# Patient Record
Sex: FEMALE | URBAN - METROPOLITAN AREA
[De-identification: names, ages, dates, MRNs, and addresses within clinical notes are randomized per-mention and may not be internally consistent; named-entity substitution may affect disease eponyms.]

---

## 2022-12-13 ENCOUNTER — HOME HEALTH ADMISSION (OUTPATIENT)
Dept: HOME HEALTH SERVICES | Facility: HOME HEALTH | Age: 77
End: 2022-12-13
Payer: MEDICARE

## 2022-12-15 ENCOUNTER — HOME CARE VISIT (OUTPATIENT)
Dept: SCHEDULING | Facility: HOME HEALTH | Age: 77
End: 2022-12-15
Payer: MEDICARE

## 2022-12-15 PROCEDURE — G0299 HHS/HOSPICE OF RN EA 15 MIN: HCPCS

## 2022-12-16 ENCOUNTER — HOME CARE VISIT (OUTPATIENT)
Dept: SCHEDULING | Facility: HOME HEALTH | Age: 77
End: 2022-12-16
Payer: MEDICARE

## 2022-12-16 PROCEDURE — G0151 HHCP-SERV OF PT,EA 15 MIN: HCPCS

## 2022-12-17 ASSESSMENT — ENCOUNTER SYMPTOMS: HEMOPTYSIS: 0

## 2022-12-18 VITALS
OXYGEN SATURATION: 97 % | DIASTOLIC BLOOD PRESSURE: 84 MMHG | SYSTOLIC BLOOD PRESSURE: 132 MMHG | RESPIRATION RATE: 18 BRPM | TEMPERATURE: 97.5 F | HEART RATE: 77 BPM

## 2022-12-18 VITALS
SYSTOLIC BLOOD PRESSURE: 138 MMHG | RESPIRATION RATE: 16 BRPM | DIASTOLIC BLOOD PRESSURE: 70 MMHG | HEART RATE: 78 BPM | TEMPERATURE: 97.4 F | OXYGEN SATURATION: 97 %

## 2022-12-18 ASSESSMENT — ENCOUNTER SYMPTOMS: DYSPNEA ACTIVITY LEVEL: AFTER AMBULATING 10 - 20 FT

## 2022-12-18 NOTE — HOME HEALTH
Pt is an 69 yo female s/p fall outside while walking to the mailbox back in July 2021. Pt underwent surgery due to patellar fx. Pt had complications with infection resulting in 2 additional surgeries. 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 Pt then began to have significant pain back in July 2022, noted to have another infection. Pt underwent another sx to remove the hardware and currently has a spacer and is on IV antibiotics through a picc line until 1/3/23. Pt is using a LLE brace at all times, able to place 20% WB and able to flex to 45 degrees. Skilled PT intervention orders received. PMH includes Fall, RTKA, HTN. Jinny Holiday. Pt lives with her  in a single story home with 1 step down to living room. Pt was independent and active without an AD prior to the initial fall. Pt is originally from [place] and will be returning in April of next year. Pt presents now using a wc mainly for all mobility, shows decreased ambulation distance, unsteady gait pattern, BLE muscle weakness, impaired dynamic balance and decreased cv endurance causing difficulty performing safe functional transfers from various surfaces throughout home, standing activities and household ambulation without assistance as able to do in [place]. Pt will benefit from skilled PT intervention in order to improve functional deficits to allow pt to perform functional transfers and ambulation throughout home independently using her RW for support. PT reviewed POC, tx frequency, tx goals, safety precautions, fall prevention strategies, safe med management and energy conservation techniques.

## 2022-12-18 NOTE — HOME HEALTH
Patient is a 68 year y/o/f discharged to home after left leg joint replacement. Past medical history of prior left knee surgeries and complications, HTN, major depressive disorder, hyperlipidemia and constipation. Patient lives with  in a one story home. Patient alert & oriented x 4, VS stable, denies any new open wounds or rashes, denies any falls since returning home. Brace to left leg intact, steri-strips clean dry and intact to left knee. Pt and  educated on IV push medication.  completed first home infusion with no problems, agrees to complete infusion every 8 hours per MD orders. Pt has follow up apt with PCP scheduled. All questions/concerns addressed, will continue to [facility]. POC approval received from PCP. Caregiver involvement:    Medications reconciled and all medications are available. Home health supplies by type and quantity ordered/delivered this visit include: NA    Patient education provided this visit: SN educated patient and patient's caregiver on Admission process, Call us first, Patient and patient caregiver verbalizes understanding via the teach back method.     Progress toward goals: General acute hospital[facility] today     Home exercise program: continue as ordered    Plan for next visit: medication/disease teaching/management    The following discharge planning was discussed with the patient/ patient's caregiver: DC when goals met

## 2022-12-19 ENCOUNTER — HOME CARE VISIT (OUTPATIENT)
Dept: SCHEDULING | Facility: HOME HEALTH | Age: 77
End: 2022-12-19
Payer: MEDICARE

## 2022-12-19 VITALS
HEART RATE: 78 BPM | OXYGEN SATURATION: 97 % | RESPIRATION RATE: 18 BRPM | SYSTOLIC BLOOD PRESSURE: 138 MMHG | DIASTOLIC BLOOD PRESSURE: 75 MMHG | TEMPERATURE: 97.7 F

## 2022-12-19 PROCEDURE — G0152 HHCP-SERV OF OT,EA 15 MIN: HCPCS

## 2022-12-19 ASSESSMENT — ENCOUNTER SYMPTOMS: HEMOPTYSIS: 0

## 2022-12-19 NOTE — HOME HEALTH
Pt is an 68 y.o female, RH, admitted to [address] [place] [address] for the dx of sx after patella fx sec a fall retrieving the mail. Pt had complications with infection resulting in 2 additional surgeries. 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 Pt underwent another sx to remove the hardware and currently has a spacer and is on IV antibiotics through a pic line scheduled to be removed on1/3/23. PMHx:  Fall, RTKA, HTN. Alicia Gang. BTKR ( 5 years a go)  PLOF: Indep with AL's and IADL's   Pt lives with her  in a single story home with 1 step down to living room, which pt is not using at [facility] time, pt is ambulating using mostly wc and accessing the house using the garage. Patient using the LLE brace at all times, she is allowed 20% WWB. No showers at this time to protect pic line. Pt is sponge bathing with CGA and requiring assistance to safley transfer to and form bed and toilet. Pt's  is very supportive and assisting patient as needed. MMT is 4-/5 and she displays WN ROM in BUE. Pt is ambulating shorts distance with assistance using 2ww. Patient was instructed in proper sequencing with bed mobility and an ongoing assessment for bed side rail. Pt could benefit from OT to adress the above issues. Pt in agreement with POC and she reports she wants to return to PLOF.

## 2022-12-20 ENCOUNTER — HOME CARE VISIT (OUTPATIENT)
Dept: SCHEDULING | Facility: HOME HEALTH | Age: 77
End: 2022-12-20
Payer: MEDICARE

## 2022-12-20 VITALS
HEART RATE: 95 BPM | SYSTOLIC BLOOD PRESSURE: 131 MMHG | DIASTOLIC BLOOD PRESSURE: 76 MMHG | OXYGEN SATURATION: 97 % | TEMPERATURE: 97.2 F | RESPIRATION RATE: 18 BRPM

## 2022-12-20 PROCEDURE — G0157 HHC PT ASSISTANT EA 15: HCPCS

## 2022-12-20 NOTE — HOME HEALTH
Patient presents in her [facility] with brace/immobilizer on her left LE and left LE elevated on WC leg rest.  Adjusted patient's immobilizer prior to ex instruction, as she stated it loosens during the day. Recommended placing soft cloth or fabric where the brace wraps posterior on her thigh to protect her skin and discussed having spouse check skin regularly to make sure there is no irritation from the brace. Patient wears her brace/immobilizer 24/7 currently. Instructed sit/stand from [facility] to RW with Vc's for protection of affected LE (left) with both sitting and standing, safe UE placement, feeling for chair before attempting to sit and pausing for balance in standing. With pre-cuing, patient improved her technique on 3/6 reps of sit/stand. Initiated standing ex at counter for her left LE with WB on the right LE including hip extension, abduction, HS curl and small knee lift -- patient tolerated reps to 15, one seated break recommended by Clinician. Initiated theraband resistance (green) with seated LE ex for her right LE including LAQ, seated side step, knee lift and leg extension circles (no resistance). Worked on standing static balance with unsupported theraband pulls (horizontal abduction). Instructed RW gait training indoors with focus on safey and 20% WB for her left LE. Patient demonstrates good knowledge of 20% WB left (LE) when ambulating with her RW, tolerates 50ft indoors on today's visit. Instructed her in seated HS/calf stretches for both legs with cloth belt for mobility and to help prevent muscle tightness and pain. Written HEP for standing ex with the left LE prepared and provided this visit. Patient tolerated all activiites well with Min fatigue. Has follow up with ortho MD tomorrow.

## 2022-12-21 ENCOUNTER — HOME CARE VISIT (OUTPATIENT)
Dept: SCHEDULING | Facility: HOME HEALTH | Age: 77
End: 2022-12-21
Payer: MEDICARE

## 2022-12-21 VITALS
HEART RATE: 99 BPM | DIASTOLIC BLOOD PRESSURE: 78 MMHG | SYSTOLIC BLOOD PRESSURE: 126 MMHG | OXYGEN SATURATION: 96 % | TEMPERATURE: 97.4 F | RESPIRATION RATE: 18 BRPM

## 2022-12-21 PROCEDURE — G0299 HHS/HOSPICE OF RN EA 15 MIN: HCPCS

## 2022-12-22 ENCOUNTER — HOME CARE VISIT (OUTPATIENT)
Dept: SCHEDULING | Facility: HOME HEALTH | Age: 77
End: 2022-12-22
Payer: MEDICARE

## 2022-12-22 PROCEDURE — G0157 HHC PT ASSISTANT EA 15: HCPCS

## 2022-12-22 ASSESSMENT — ENCOUNTER SYMPTOMS
DYSPNEA ACTIVITY LEVEL: AFTER AMBULATING LESS THAN 10 FT
HEMOPTYSIS: 0

## 2022-12-22 NOTE — HOME HEALTH
SN for skilled assessments, education/management of medications, safety and disease process. PICC line maintence completed, tolerated well. Progressing in teaching and improving in care. VS all wnl, denies any changes in medication, shob, falls, pain, or any new open wounds at this time. No s/s of distress during SN visit. Aware and approves of next scheduled SN visit.

## 2022-12-23 VITALS
RESPIRATION RATE: 18 BRPM | SYSTOLIC BLOOD PRESSURE: 126 MMHG | DIASTOLIC BLOOD PRESSURE: 84 MMHG | OXYGEN SATURATION: 97 % | HEART RATE: 89 BPM | TEMPERATURE: 97 F

## 2022-12-23 NOTE — HOME HEALTH
Patient states saw Dr. Jim Nicolas yesterday for follow up and that MD has progressed her to 50% WB (L) LE from 20%. Patient is still wearing immobilizer on left LE. She states MD ok'd a less restrictive brace for overnight in bed, but she plans to keep the immobilizer on overnight to support her knee. Instructed her in 50% WB with RW gait indoors, throughout the home and progressed her to RW gait training outside through garage to front driveway. 80 ft and 150 ft with seated rest in between segments. Gait training included step training at back door and education on lead leg with steps for both patient and spouse so that he can help reinforce safe technique. Showed patient how to position self and walker with step so that she can use increased UE support to take presssure off her leg and stabilize herself with the non-surgery leg. Patient was SBA w/Vcs going down step and MIN/CGA going up. Unable to safely navigate step at back patio due to the step being wide and deep. Discussed that patient will use the garage door and entrance to get outside with the walker, with SBA from her spouse for safely. Her adherance to 50% WB once instructed was good with cues to control her stride and advance walker so she does not step outside the walker. Educated patient on safe RW turns to avoid pivoting -- 50% cues for safe turns. Patient showed good balance on uneven pavers outside using RW and reported MIN fatigue with increased distance. Re-instructed standing LE ex for the left LE, including safe body position to counter and safe ALLYN with the exercises. Also re-instructed seated LE HEP with manual resistance added for the right LE. Patient verbalized understanding of all education today.

## 2022-12-27 ENCOUNTER — HOME CARE VISIT (OUTPATIENT)
Dept: SCHEDULING | Facility: HOME HEALTH | Age: 77
End: 2022-12-27
Payer: MEDICARE

## 2022-12-27 VITALS
RESPIRATION RATE: 18 BRPM | HEART RATE: 87 BPM | OXYGEN SATURATION: 98 % | DIASTOLIC BLOOD PRESSURE: 77 MMHG | TEMPERATURE: 97.9 F | SYSTOLIC BLOOD PRESSURE: 134 MMHG

## 2022-12-27 PROCEDURE — G0157 HHC PT ASSISTANT EA 15: HCPCS

## 2022-12-27 NOTE — HOME HEALTH
Patient is in good spirits and states has ambulated into the garage with her RW (includes 1 step) last few days for light laundry chores. Patient uses a visual reminder (pen alphonso on each thigh) to remind her of correct lead leg with steps. Instructed step training during today's visit including step from kitchen to garage and step from garage to outside walkway (2x each step). 30% cues given for increased UE support on the walker with steps and positioning herself close enough to the step to avoid excessive forward flexion. Gait distance about 50 ft. Patient demonstrated improved technique on her second attempt with these steps. Instructed RW gait training through the home, having patient access all available rooms on the lower level -- 80 ft. Patient demonstrates good awareness of 50% WB for her left LE with RW gait and transfers. Encouraged her to walk household distances with her RW more frequently at 50% WB and have  present for RW gait directly outside the home. Patient declines outside gait today due to cold weather. Re-educated her on keeping her RW close to where she sits so she is not tempted to stand without an AD. Re-instructed her standing LE HEP for the left LE (surgery leg), 30% cues provided for safe body position to counter, safe ALLYN and posture. Patient tolerates 2 activities at a time standing today. Worked on balance with side steps at counter (2 hand support) and unsupported static standing with forward and sideways reaching. Instructed sit/stand from arm chair with focus on controlled descent to chair. Patient improved 4/5 reps of sit/stand from RW to arm chair after cuing.   She verbalizes understanding of all education provided today including safe body mechanics with ADLS both sitting and standing, keeping RW close to where she sits with transfers, resting before tempted to place too much weight on surgery leg and having spouse on SBA for outside RW ambulation. Signature on paper, forgot signature on visit.

## 2022-12-28 ENCOUNTER — HOME CARE VISIT (OUTPATIENT)
Dept: HOME HEALTH SERVICES | Facility: HOME HEALTH | Age: 77
End: 2022-12-28
Payer: MEDICARE

## 2022-12-28 ENCOUNTER — HOME CARE VISIT (OUTPATIENT)
Dept: SCHEDULING | Facility: HOME HEALTH | Age: 77
End: 2022-12-28
Payer: MEDICARE

## 2022-12-28 VITALS
TEMPERATURE: 97.3 F | SYSTOLIC BLOOD PRESSURE: 124 MMHG | RESPIRATION RATE: 16 BRPM | DIASTOLIC BLOOD PRESSURE: 68 MMHG | HEART RATE: 78 BPM | OXYGEN SATURATION: 97 %

## 2022-12-28 PROCEDURE — G0299 HHS/HOSPICE OF RN EA 15 MIN: HCPCS

## 2022-12-28 ASSESSMENT — ENCOUNTER SYMPTOMS
HEMOPTYSIS: 0
DYSPNEA ACTIVITY LEVEL: AFTER AMBULATING 10 - 20 FT

## 2022-12-29 ENCOUNTER — HOME CARE VISIT (OUTPATIENT)
Dept: SCHEDULING | Facility: HOME HEALTH | Age: 77
End: 2022-12-29
Payer: MEDICARE

## 2022-12-29 VITALS
TEMPERATURE: 97.9 F | HEART RATE: 99 BPM | DIASTOLIC BLOOD PRESSURE: 71 MMHG | RESPIRATION RATE: 18 BRPM | SYSTOLIC BLOOD PRESSURE: 129 MMHG | OXYGEN SATURATION: 98 %

## 2022-12-29 PROCEDURE — G0157 HHC PT ASSISTANT EA 15: HCPCS

## 2022-12-29 NOTE — HOME HEALTH
Patient was re-educated in fall prevention strategies including keeping RW close to wear she sits or rests for use as AD with standing/walking, using 2WW rather than 4WW for ambulation due to 50% WB left LE, lead leg and positioning with household steps and having spouse on SBA for outside ambulation. Patient verbalizes understanding of fall prevention education and states agreeable. Instructed RW gait training both indoors and outside the house today. Patient tolerates 100 ft indoors, accessing kitchen, living area and bedroom with her RW with good adherance to 50% WB LLE. Still needs cues to avoid stepping outside the front of the walker. Today she was agreeable to gait training outside using garage entrance to access side walkway and back patio 100 ft x 2 with one seated break. Requires cuing and review of lead leg and positioning with step. Re-assessed step at back patio door with spouse, and determined the step remains too wide and too deep for patient at 50% WB LLE. Patient is able to manage the garage step with cuing for lead leg. Encouraged her to walk household distances more frequently as she feels comfortable and stand more frequently to RW for strengthening. Instructed sit/stand from arm chair to RW with progression of super slow pace (4-5 seconds in each direction) with patient using bilateral UE support and cues to use her gluts more efficiently. Increased reps with sit/stand to 10, patient improves her control of descent to chair at slow pace on 8/10 reps with cuing. Instructed and reviewed her seated LE HEP using theraband resistance for the right LE (non surgery leg) and reviewed her standing LE HEP for her left LE. Patient tolerates increased reps with theraband on the right LE seated. Continues to need cues for safe body position to counter and wider ALLYN with standing ex. Side steps at counter (2 hand support) and unsupported static standing instructed for balance.   Patient tolerates all activities well with MIN fatigue.

## 2022-12-30 ENCOUNTER — HOME CARE VISIT (OUTPATIENT)
Dept: HOME HEALTH SERVICES | Facility: HOME HEALTH | Age: 77
End: 2022-12-30
Payer: MEDICARE

## 2022-12-30 NOTE — HOME HEALTH
Patient was contacted via phone to scheduleOT visit on Friday and she reported , she wanted to cancel OT visist, she feels she does not need OT at this time.

## 2023-01-04 ENCOUNTER — HOME CARE VISIT (OUTPATIENT)
Dept: SCHEDULING | Facility: HOME HEALTH | Age: 78
End: 2023-01-04
Payer: MEDICARE

## 2023-01-04 VITALS
TEMPERATURE: 97.5 F | DIASTOLIC BLOOD PRESSURE: 71 MMHG | OXYGEN SATURATION: 98 % | SYSTOLIC BLOOD PRESSURE: 121 MMHG | HEART RATE: 89 BPM | RESPIRATION RATE: 18 BRPM

## 2023-01-04 PROCEDURE — G0157 HHC PT ASSISTANT EA 15: HCPCS

## 2023-01-04 PROCEDURE — G0299 HHS/HOSPICE OF RN EA 15 MIN: HCPCS

## 2023-01-05 ENCOUNTER — HOME CARE VISIT (OUTPATIENT)
Dept: SCHEDULING | Facility: HOME HEALTH | Age: 78
End: 2023-01-05
Payer: MEDICARE

## 2023-01-05 VITALS
HEART RATE: 77 BPM | SYSTOLIC BLOOD PRESSURE: 132 MMHG | RESPIRATION RATE: 16 BRPM | TEMPERATURE: 97.4 F | DIASTOLIC BLOOD PRESSURE: 70 MMHG | OXYGEN SATURATION: 98 %

## 2023-01-05 ASSESSMENT — ENCOUNTER SYMPTOMS
HEMOPTYSIS: 0
DYSPNEA ACTIVITY LEVEL: AFTER AMBULATING 10 - 20 FT
HEMOPTYSIS: 0

## 2023-01-05 NOTE — HOME HEALTH
Patient reports getting her pik line out later today and plans to shower afterwards. Assessed shower set up and recommended adding a vertical grab bar for stepping in/out of the shower. Provided grab bar contact. Patient has a shower chair and one small grab bar inside the shower currently. Spouse available to help with showers. Re-adjusted patient's left LE immobilizer brace by removing the brace as she sat in arm chair with leg supported on ottoman, re-adjusted all closures, placed patient's left LE back into the brace and fastened securely. She reported the brace fit and felt much better with minimal slippage. Discussed re-adjusting brace with help from spouse in this manner more than 1x a day, especially as she is becoming more mobile. Showed her how to use soft cloths to pad the brace as needed to prevent skin friction. No skin issues observed at this time. Instructed patient in RW gait training inside and outside the house today, including step training at both front door entrance and garage entrance. Patient was able to manage the step leading to the front door today for the first time with cues for positioning and increased use of her UE to take pressure off her left LE. Her recall of lead leg with steps was consistently improved today. She tolerated 100 ft RW gait training indoors followed by 100 ft outside the house on uneven pavers with SBA outside and safety cues for navigating the RW in narrow spaces within the garage. Recommended clearing space in the garage for safe RW gait. Instructed vehicle transfer into patient's pick-up truck, using a riser step she provided. Patient was CGA/MIN for the transfer in/out of the pick-up into the passenger seat with cues for placement of the riser step, UE placement and scooting hips farther into the seat before attempting to bring left leg into vehicle. Needs MIN assistance for safely placing the riser step to transfer both in and out of the vehicle. Instructed her in standing LE HEP for left LE only. Patient tolerates reps to 15 with one seated break for standing ex and some fatigue. Reviewed her seated LE HEP with theraband and emphasis on right LE strengthening in seated. Patient reports she is ambulating more frequently in the home with her RW as discussed last visit and shows good compliance with 50% WB left LE. Continues to need safety reminders with transfers such as keeping RW in front of her and also closer to where she rests or stands. Patient verbalizes understanding of all education today, including review of her HEP.

## 2023-01-05 NOTE — HOME HEALTH
SN for skilled assessments, education/management of medications, safety and disease process. Progressing in teaching and improving in care. VS all wnl, denies any changes in medication, shob, falls, pain, or any new open wounds at this time. SN removed PICC while pt laid supine on couch, held pressure for 5 minutes, no bleeding, cath intact, tolerated well. Insertion site has no s/s of infection. Pt instructed to lay supine fot 30 minutes, not to lift more than 10lbs in 24 hours, leave pressure dressing on for 24 hours, if dressing becomes soaked with blood - hold pressure and advance to the nearest ED. Pt verbalized understanding. No s/s of distress during SN visit. Aware and approves of next scheduled SN visit.

## 2023-01-06 ENCOUNTER — HOME CARE VISIT (OUTPATIENT)
Dept: SCHEDULING | Facility: HOME HEALTH | Age: 78
End: 2023-01-06
Payer: MEDICARE

## 2023-01-06 VITALS
OXYGEN SATURATION: 97 % | HEART RATE: 90 BPM | SYSTOLIC BLOOD PRESSURE: 125 MMHG | TEMPERATURE: 97.7 F | DIASTOLIC BLOOD PRESSURE: 69 MMHG | RESPIRATION RATE: 18 BRPM

## 2023-01-06 PROCEDURE — G0151 HHCP-SERV OF PT,EA 15 MIN: HCPCS

## 2023-01-06 NOTE — HOME HEALTH
Patient has bee dc from OT as per pt request. She was evaluated on 12/19/22 and POC established to see pt 1w4. Pt refused visit week of 12/25/22 and now she requested to be dc from OT she feels she is doing well with PT and wants to continue with only PT at this time .

## 2023-01-08 ASSESSMENT — ENCOUNTER SYMPTOMS: PAIN LOCATION - PAIN QUALITY: DULL

## 2023-01-10 ENCOUNTER — HOME CARE VISIT (OUTPATIENT)
Dept: SCHEDULING | Facility: HOME HEALTH | Age: 78
End: 2023-01-10
Payer: MEDICARE

## 2023-01-10 VITALS
HEART RATE: 88 BPM | TEMPERATURE: 97.4 F | DIASTOLIC BLOOD PRESSURE: 78 MMHG | RESPIRATION RATE: 18 BRPM | OXYGEN SATURATION: 99 % | SYSTOLIC BLOOD PRESSURE: 122 MMHG

## 2023-01-10 PROCEDURE — G0299 HHS/HOSPICE OF RN EA 15 MIN: HCPCS

## 2023-01-10 ASSESSMENT — ENCOUNTER SYMPTOMS: HEMOPTYSIS: 0

## 2023-01-11 NOTE — HOME HEALTH
SN for skilled assessments, education/management of medications, safety and disease process. VS all wnl, denies any changes in medication, shob, falls, pain, or any new open wounds at this time. No s/s of distress during SN visit.   Aware and approves of [facility] discharge

## 2023-03-30 ENCOUNTER — HOME HEALTH ADMISSION (OUTPATIENT)
Dept: HOME HEALTH SERVICES | Facility: HOME HEALTH | Age: 78
End: 2023-03-30
Payer: MEDICARE

## 2023-04-01 ENCOUNTER — HOME CARE VISIT (OUTPATIENT)
Dept: SCHEDULING | Facility: HOME HEALTH | Age: 78
End: 2023-04-01
Payer: MEDICARE

## 2023-04-01 VITALS
SYSTOLIC BLOOD PRESSURE: 138 MMHG | TEMPERATURE: 97.3 F | OXYGEN SATURATION: 98 % | HEART RATE: 78 BPM | DIASTOLIC BLOOD PRESSURE: 78 MMHG | RESPIRATION RATE: 20 BRPM

## 2023-04-01 PROCEDURE — G0299 HHS/HOSPICE OF RN EA 15 MIN: HCPCS

## 2023-04-01 ASSESSMENT — ENCOUNTER SYMPTOMS
HEMOPTYSIS: 0
STOOL DESCRIPTION: FORMED
DYSPNEA ACTIVITY LEVEL: AFTER AMBULATING MORE THAN 20 FT
PAIN LOCATION - PAIN QUALITY: DULL

## 2023-04-02 NOTE — HOME HEALTH
Patient with Infection and inflammatory reaction due to other internal prosthetic devices, implants and grafts, subsequent encounter     POC approval received from PCP. Wound Care DCD  Provided per care plan. Pt tolerated well. Caregiver involvement: Supportive      Medications reconciled and all medications are available. MD notified of Severe medication interaction. No new orders at this time. Home health supplies by type and quantity ordered/delivered this visit include: Foam dressings    Patient education provided this visit: SN educated patient and patient's caregiver on 4/1/23.  Patient and patient caregiver verbalizes understanding via the teach back method.      Progress toward goals: progressing well    Home exercise program: Occupational therapy to eval and treat    Plan for next visit:  weekly visit for PICC line dressing change and wound assessment    The following discharge planning was discussed with the patient/ patient's caregiver: DC when goals met

## 2023-04-03 ENCOUNTER — HOME CARE VISIT (OUTPATIENT)
Dept: SCHEDULING | Facility: HOME HEALTH | Age: 78
End: 2023-04-03
Payer: MEDICARE

## 2023-04-03 PROCEDURE — G0299 HHS/HOSPICE OF RN EA 15 MIN: HCPCS

## 2023-04-04 VITALS
OXYGEN SATURATION: 98 % | RESPIRATION RATE: 18 BRPM | TEMPERATURE: 97.6 F | SYSTOLIC BLOOD PRESSURE: 140 MMHG | DIASTOLIC BLOOD PRESSURE: 68 MMHG | HEART RATE: 76 BPM

## 2023-04-05 ASSESSMENT — ENCOUNTER SYMPTOMS: PAIN LOCATION - PAIN QUALITY: DULL

## 2023-04-05 NOTE — HOME HEALTH
This nurse was asked to go out for a PRN visit as the  has beed performing the IVP antibiodics daily w/o issue but this morning some bright red blood reported to be coming from the PICC insertion site. Patient and spouse requested nurse visit. SN arrived to find minimal red blood arund site through transparent dressing. SN attempted to flush with NS, met some resistence but was able to flush. @nd NS flush performed. Apparently no heparing being used for line patency/not ordered. SN then placed patient in bed and performed sterile picc line dressing change, No further bleeding noted. SN then adminstered her ancef 1 G IVP without difficulty and flushed line. No other issues or concerns and patient/ thanked this nurse for coming.  with resume IVP next doses.

## 2023-04-17 ENCOUNTER — HOME CARE VISIT (OUTPATIENT)
Dept: SCHEDULING | Facility: HOME HEALTH | Age: 78
End: 2023-04-17
Payer: MEDICARE

## 2023-04-17 PROCEDURE — G0299 HHS/HOSPICE OF RN EA 15 MIN: HCPCS

## 2023-04-19 VITALS
RESPIRATION RATE: 18 BRPM | HEART RATE: 81 BPM | OXYGEN SATURATION: 98 % | SYSTOLIC BLOOD PRESSURE: 140 MMHG | TEMPERATURE: 98.1 F | DIASTOLIC BLOOD PRESSURE: 80 MMHG

## 2023-04-19 ASSESSMENT — ENCOUNTER SYMPTOMS: PAIN LOCATION - PAIN QUALITY: DULL/ACHY

## 2023-04-20 NOTE — HOME HEALTH
SN visit today for weekly PICC line dressing change. Dressing change performed per sterile technique. Pt tolerated well. L knee and LLE incisions are intact and well approximated. Caregiver involvement: Patient is her own PCG and  is performing her IVP antibiodic therapy-2 weeks left. Medications reconciled and all medications are available in [facility]-no changes   Home health supplies by type and quantity ordered/delivered this visit include:all supplies in place   Patient education provided this visit: SN educated patient and patient's caregiver con't to follow SASH method and sterile technique for her IVP antibiodic tx;s. Patient and patient caregiver verbalizes understanding via the teach back method. Progress toward goals: progressing well   Home exercise program: n/a   Plan for next visit: picc line dressing change and review of all systems and progress towards goals. The following discharge planning was discussed with the patient/ patient's caregiver: DC when goals met and IVP therapy complete and PIcc line pulled.

## 2023-04-27 ENCOUNTER — HOME CARE VISIT (OUTPATIENT)
Dept: SCHEDULING | Facility: HOME HEALTH | Age: 78
End: 2023-04-27
Payer: MEDICARE

## 2023-04-27 PROCEDURE — G0299 HHS/HOSPICE OF RN EA 15 MIN: HCPCS

## 2023-05-01 VITALS
TEMPERATURE: 97.9 F | OXYGEN SATURATION: 96 % | RESPIRATION RATE: 18 BRPM | HEART RATE: 82 BPM | SYSTOLIC BLOOD PRESSURE: 136 MMHG | DIASTOLIC BLOOD PRESSURE: 70 MMHG